# Patient Record
Sex: FEMALE | Race: BLACK OR AFRICAN AMERICAN | Employment: FULL TIME | URBAN - METROPOLITAN AREA
[De-identification: names, ages, dates, MRNs, and addresses within clinical notes are randomized per-mention and may not be internally consistent; named-entity substitution may affect disease eponyms.]

---

## 2017-03-20 ENCOUNTER — OFFICE VISIT (OUTPATIENT)
Dept: INTERNAL MEDICINE CLINIC | Age: 22
End: 2017-03-20

## 2017-03-20 ENCOUNTER — HOSPITAL ENCOUNTER (OUTPATIENT)
Dept: LAB | Age: 22
Discharge: HOME | End: 2017-03-20

## 2017-03-20 VITALS
BODY MASS INDEX: 24.44 KG/M2 | RESPIRATION RATE: 18 BRPM | HEIGHT: 66 IN | HEART RATE: 88 BPM | WEIGHT: 152.1 LBS | DIASTOLIC BLOOD PRESSURE: 70 MMHG | OXYGEN SATURATION: 97 % | TEMPERATURE: 97.7 F | SYSTOLIC BLOOD PRESSURE: 102 MMHG

## 2017-03-20 DIAGNOSIS — Z00.00 ROUTINE MEDICAL EXAM: Primary | ICD-10-CM

## 2017-03-20 DIAGNOSIS — E07.89 THYROID FULLNESS: ICD-10-CM

## 2017-03-20 DIAGNOSIS — R53.83 FATIGUE, UNSPECIFIED TYPE: ICD-10-CM

## 2017-03-20 DIAGNOSIS — L65.9 HAIR LOSS: ICD-10-CM

## 2017-03-20 DIAGNOSIS — Z11.59 SCREENING FOR VIRAL DISEASE: ICD-10-CM

## 2017-03-20 DIAGNOSIS — R21 RASH: ICD-10-CM

## 2017-03-20 PROCEDURE — 99001 SPECIMEN HANDLING PT-LAB: CPT | Performed by: NURSE PRACTITIONER

## 2017-03-20 RX ORDER — METRONIDAZOLE 500 MG/1
TABLET ORAL
Refills: 0 | COMMUNITY
Start: 2017-03-07 | End: 2017-03-20 | Stop reason: ALTCHOICE

## 2017-03-20 RX ORDER — IBUPROFEN 800 MG/1
TABLET ORAL
Refills: 0 | COMMUNITY
Start: 2017-01-23 | End: 2017-10-13 | Stop reason: SDUPTHER

## 2017-03-20 RX ORDER — HYDROXYZINE PAMOATE 25 MG/1
CAPSULE ORAL
Refills: 0 | COMMUNITY
Start: 2017-03-17 | End: 2017-03-20 | Stop reason: SDUPTHER

## 2017-03-20 RX ORDER — HYDROXYZINE PAMOATE 25 MG/1
25 CAPSULE ORAL
Qty: 30 CAP | Refills: 0 | Status: SHIPPED | OUTPATIENT
Start: 2017-03-20 | End: 2017-05-08 | Stop reason: SDUPTHER

## 2017-03-20 RX ORDER — AZITHROMYCIN 250 MG/1
TABLET, FILM COATED ORAL
Refills: 0 | COMMUNITY
Start: 2017-03-07 | End: 2017-03-20

## 2017-03-20 RX ORDER — TRIAMCINOLONE ACETONIDE 1 MG/G
CREAM TOPICAL
Refills: 0 | COMMUNITY
Start: 2017-03-17 | End: 2018-11-02 | Stop reason: ALTCHOICE

## 2017-03-20 RX ORDER — METHYLPREDNISOLONE 4 MG/1
TABLET ORAL
Refills: 0 | COMMUNITY
Start: 2017-03-07 | End: 2017-03-20

## 2017-03-20 RX ORDER — DIAZEPAM 5 MG/1
TABLET ORAL
Refills: 0 | COMMUNITY
Start: 2017-01-23 | End: 2017-03-20

## 2017-03-20 RX ORDER — ALBUTEROL SULFATE 90 UG/1
AEROSOL, METERED RESPIRATORY (INHALATION)
Refills: 0 | COMMUNITY
Start: 2017-03-07 | End: 2018-11-02 | Stop reason: SDUPTHER

## 2017-03-20 NOTE — PROGRESS NOTES
Subjective: (As above and below)     Chief Complaint   Patient presents with    Rash     Pt Diagnosis with Pittyrisis Rosea states it has excasobated    Allergic Reaction     Scabe like rash     Thyroid Problem     Diagnosis with enlarge thyroid     Champ uQintana is a 23y.o. year old female who presents to establish care and discuss the following concerns:    Last PCP: over a year ago. She has an OBGYN elsewhere. Rash: she was diagnosed with ptyriasis rosea in the [address] [place] [address] emergency room 1 week ago. She has had this rash for 3 months now. She had the typical [place] Patch on her abdomen followed by dry stuck on, highly pruritic patches on her trunk and back. She states that she was given hydroxyzine which is helping with the itching, but she is concerned that she is still getting new patches - now on her inner thighs. She was also recently on flagyl (for [facility]) and z-rico for a cough - she could not tolerated the z-pack due to nausea and vomiting. She completed the flagyl - she has been on this medication several times in the past for BV. She has been using one detergent and has avoiding anything new. She has a history of eczema, and has steroid cream for this, but it was not helping with the current rash. Thyroid fullness: she was told in the past that her thyroid was enlarged but not further testing was done per patient. She does report fatigue and is concerned with a bald patch in her hairline - middle of her forehead. No other hair loss. No dysphagia. No known family hx of thyroid disorder. Reviewed PmHx, RxHx, FmHx, SocHx, AllgHx and updated in chart.     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 No current facility-administered medications for this visit. Review of Systems:   Constitutional:    Negative for fever and chills, negative diaphoresis. HEENT:              Negative for neck pain and stiffness. Eyes:                  Negative for visual disturbance, itching, redness or discharge. Respiratory:        Negative for cough and shortness of breath. Cardiovascular:  Negative for chest pain and palpitations. Gastrointestinal: Negative for nausea, vomiting, abdominal pain, diarrhea or constipation. Genitourinary:     Negative for dysuria and frequency. Musculoskeletal: Negative for falls, tenderness and swelling. Skin:                    +rash  Neurological:       Negative for dizzyness, seizure, loss of consciousness, weakness and numbness.      Objective:     Vitals:    03/20/17 1519   BP: 102/70   Pulse: 88   Resp: 18   Temp: 97.7 °F (36.5 °C)   TempSrc: Oral   SpO2: 97%   Weight: 152 lb 1.6 oz (69 kg)   Height: 5' 6\" (1.676 m)         Physical Examination: General appearance - alert, well appearing, and in no distress  Eyes - pupils equal and reactive, extraocular eye movements intact  Ears - bilateral TM's and external ear canals normal  Mouth - mucous membranes moist, pharynx normal without lesions  Neck - supple, no significant adenopathy. Thyroid: she does have bilateral fullness - no distinct nodules palpated  Chest - clear to auscultation, no wheezes, rales or rhonchi, symmetric air entry  Heart - normal rate, regular rhythm, normal S1, S2, no murmurs, rubs, clicks or gallops  Skin - rash present on abdomen, back, nape of neck and inner thighs. She does have a larger patch ([place] patch) on right side of abdomen - other lesions are dry circular patches approx 2cm-6cm. No redness. No s/s of secondary infxn    Hairline: in the middle of her forehead at hairline, she does have mild thinning of hair    Assessment/ Plan:   Follow-up Disposition:  Return if symptoms worsen or fail to improve. Reassured patient regarding rash, but recc derm f/u since it is continuing to spread after 3 months    1. Routine medical exam    - TSH REFLEX TO T4  - METABOLIC PANEL, COMPREHENSIVE    2. Rash    - REFERRAL TO DERMATOLOGY  - METABOLIC PANEL, COMPREHENSIVE  - CBC WITH AUTOMATED DIFF  - hydrOXYzine pamoate (VISTARIL) 25 mg capsule; Take 1 Cap by mouth three (3) times daily as needed for Itching. Dispense: 30 Cap; Refill: 0    3. Thyroid fullness    - US THYROID/PARATHYROID/SOFT TISS; Future  - TSH REFLEX TO T4    4. Screening for viral disease    - HIV 1/2 AG/AB, 4TH GENERATION,W RFLX CONFIRM    5. Hair loss    - TSH REFLEX TO T4  - METABOLIC PANEL, COMPREHENSIVE  - CBC WITH AUTOMATED DIFF    6. Fatigue, unspecified type    - METABOLIC PANEL, COMPREHENSIVE  - CBC WITH AUTOMATED DIFF        I have discussed the diagnosis with the patient and the intended plan as seen in the above orders. The patient has received an after-visit summary and questions were answered concerning future plans. Pt conveyed understanding of plan.       Medication Side Effects and Warnings were discussed with patient: yes  Patient Labs were reviewed: yes  Patient Past Records were reviewed: yes    Rosaline Shah, NP

## 2017-03-20 NOTE — PATIENT INSTRUCTIONS

## 2017-03-20 NOTE — MR AVS SNAPSHOT
Visit Information Date & Time Provider Department Dept. Phone Encounter #  
 3/20/2017  3:00 PM Rosaline MARIE Amanda Romero, [address]  [id number] Follow-up Instructions Return if symptoms worsen or fail to improve. Upcoming Health Maintenance Date Due  
 HPV AGE 9Y-34Y (1 of 3 - Female 3 Dose Series) 2/22/2004 DTaP/Tdap/Td series (1 - Tdap) 2/22/2014 PAP AKA CERVICAL CYTOLOGY 2/22/2014 INFLUENZA AGE 9 TO ADULT 8/1/2016 Allergies as of 3/20/2017  Review Complete On: 3/20/2017 By: Chaka Woodruff. Amanda Romero NP Severity Noted Reaction Type Reactions Azithromycin  03/20/2017   Intolerance Other (comments) Nausea, vomiting Pcn [Penicillins]  03/20/2017   Side Effect Other (comments) Current Immunizations  Never Reviewed No immunizations on file. Not reviewed this visit You Were Diagnosed With   
  
 Codes Comments Routine medical exam    -  Primary ICD-10-CM: Z00.00 ICD-9-CM: V70.0 Rash     ICD-10-CM: R21 
ICD-9-CM: 782.1 Thyroid fullness     ICD-10-CM: E07.89 ICD-9-CM: 246.8 Screening for viral disease     ICD-10-CM: Z11.59 
ICD-9-CM: V73.99 Hair loss     ICD-10-CM: L65.9 ICD-9-CM: 704.00 Fatigue, unspecified type     ICD-10-CM: R53.83 ICD-9-CM: 780.79 Vitals BP Pulse Temp Resp Height(growth percentile) Weight(growth percentile) 102/70 (BP 1 Location: Left arm, BP Patient Position: Sitting) 88 97.7 °F (36.5 °C) (Oral) 18 5' 6\" (1.676 m) 152 lb 1.6 oz (69 kg) LMP SpO2 BMI OB Status Smoking Status 02/20/2017 (Exact Date) 97% 24.55 kg/m2 Having regular periods Never Smoker BMI and BSA Data Body Mass Index Body Surface Area 24.55 kg/m 2 1.79 m 2 Preferred Pharmacy Pharmacy Name Phone [phone] [facility], [address] [facility] [phone] Your Updated Medication List  
  
   
 This list is accurate as of: 3/20/17  3:57 PM.  Always use your most recent med list.  
  
  
  
  
 hydrOXYzine pamoate 25 mg capsule Commonly known as:  VISTARIL Take 1 Cap by mouth three (3) times daily as needed for Itching. ibuprofen 800 mg tablet Commonly known as:  MOTRIN  
take 1 tablet by mouth every 8 hours if needed  
  
 triamcinolone acetonide 0.1 % topical cream  
Commonly known as:  KENALOG  
apply to affected area twice a day UNITL RESOLVED  
  
 VENTOLIN HFA 90 mcg/actuation inhaler Generic drug:  albuterol  
inhale 2 puffs by mouth every 4 hours if needed for wheezing Prescriptions Sent to Pharmacy Refills  
 hydrOXYzine pamoate (VISTARIL) 25 mg capsule 0 Sig: Take 1 Cap by mouth three (3) times daily as needed for Itching. Class: Normal  
 Pharmacy: [address] [place] [address] #: [phone] Route: Oral  
  
We Performed the Following CBC WITH AUTOMATED DIFF [75152 CPT(R)] HIV 1/2 AG/AB, 4TH GENERATION,W RFLX CONFIRM [[id number] Custom] METABOLIC PANEL, COMPREHENSIVE [97925 CPT(R)] REFERRAL TO DERMATOLOGY [REF19 Custom] Comments:  
 Please evaluate patient for ?ptyriasis but > 3months TSH REFLEX TO T4 [[id number] Custom] Follow-up Instructions Return if symptoms worsen or fail to improve. To-Do List   
 03/20/2017 Imaging:  US THYROID/PARATHYROID/SOFT TISS Referral Information Referral ID Referred By Referred To  
  
 [id number] Divina Jacome DO   
   [facility] [place] [address] Phone: [phone] Fax: [phone] Visits Status Start Date End Date 1 New Request 3/20/17 3/20/18 If your referral has a status of pending review or denied, additional information will be sent to support the outcome of this decision. Patient Instructions Rash: Care Instructions Your Care Instructions A rash is any irritation or inflammation of the skin. Rashes have many possible causes, including allergy, infection, illness, heat, and emotional stress. Follow-up care is a key part of your treatment and safety. Be sure to make and go to all appointments, and call your doctor if you are having problems. Its also a good idea to know your test results and keep a list of the medicines you take. How can you care for yourself at home? · Wash the area with water only. Soap can make dryness and itching worse. Pat dry. · Put cold, wet cloths on the rash to reduce itching. · Keep cool, and stay out of the sun. · Leave the rash open to the air as much of the time as possible. · Sometimes petroleum jelly (Vaseline) can help relieve the discomfort caused by a rash. A moisturizing lotion, such as Cetaphil, also may help. Calamine lotion may help for rashes caused by contact with something (such as a plant or soap) that irritated the skin. Use it 3 or 4 times a day. · If your doctor prescribed a cream, use it as directed. If your doctor prescribed medicine, take it exactly as directed. · If your rash itches so badly that it interferes with your normal activities, take an over-the-counter antihistamine, such as diphenhydramine (Benadryl) or loratadine (Claritin). Read and follow all instructions on the label. When should you call for help? Call your doctor now or seek immediate medical care if: 
· You have signs of infection, such as: 
¨ Increased pain, swelling, warmth, or redness. ¨ Red streaks leading from the area. ¨ Pus draining from the area. ¨ A fever. · You have joint pain along with the rash. Watch closely for changes in your health, and be sure to contact your doctor if: 
· Your rash is changing or getting worse. For example, call if you have pain along with the rash, the rash is spreading, or you have new blisters. · You do not get better after 1 week. Where can you learn more? Go to http://selena-slona.info/. Enter L809 in the search box to learn more about \"Rash: Care Instructions. \" Current as of: February 5, 2016 Content Version: 11.1 © 0832-5528 [employer]. Care instructions adapted under license by [employer] (which disclaims liability or warranty for this information). If you have questions about a medical condition or this instruction, always ask your healthcare professional. [address] any warranty or liability for your use of this information. Introducing [facility]! Glenna Lamb introduces Save On Medical patient portal. Now you can access parts of your medical record, email your doctor's office, and request medication refills online. 1. In your internet browser, go to [email or web address] 2. Click on the First Time User? Click Here link in the Sign In box. You will see the New Member Sign Up page. 3. Enter your Save On [employer] Access Code exactly as it appears below. You will not need to use this code after youve completed the sign-up process. If you do not sign up before the expiration date, you must request a new code. · [employer] Access Code: [email or web address] Expires: 6/18/2017  2:49 PM 
 
4. Enter the last four digits of your Social Security Number (xxxx) and Date of Birth (mm/dd/yyyy) as indicated and click Submit. You will be taken to the next sign-up page. 5. Create a Asante Solutionst ID. This will be your Save On Medical login ID and cannot be changed, so think of one that is secure and easy to remember. 6. Create a Save On Medical password. You can change your password at any time. 7. Enter your Password Reset Question and Answer. This can be used at a later time if you forget your password. 8. Enter your e-mail address. You will receive e-mail notification when new information is available in [address] 9. Click Sign Up. You can now view and download portions of your medical record. 10. Click the Download Summary menu link to download a portable copy of your medical information. If you have questions, please visit the Frequently Asked Questions section of the rumr: turn off the lights website. Remember, rumr: turn off the lights is NOT to be used for urgent needs. For medical emergencies, dial 911. Now available from your iPhone and Android! Please provide this summary of care documentation to your next provider. Your primary care clinician is listed as Irina Varghese. If you have any questions after today's visit, please call [phone].

## 2017-03-20 NOTE — PROGRESS NOTES
Pt here for   Chief Complaint   Patient presents with    Rash     Pt Diagnosis with Pittyrisis Leeleea states it has excasobated    Allergic Reaction     Scabe like rash     Thyroid Problem     Diagnosis with enlarge thyroid         1. Have you been to the ER, urgent care clinic since your last visit? Hospitalized since your last visit? Yes Where: [facility] 2 weeks ago cough    2. Have you seen or consulted any other health care providers outside of the [address] [place] [address] since your last visit? Include any pap smears or colon screening.  No

## 2017-03-21 LAB
ALBUMIN SERPL-MCNC: 4.7 G/DL (ref 3.5–5.5)
ALBUMIN/GLOB SERPL: 1.6 {RATIO} (ref 1.2–2.2)
ALP SERPL-CCNC: 55 IU/L (ref 39–117)
ALT SERPL-CCNC: 9 IU/L (ref 0–32)
AST SERPL-CCNC: 13 IU/L (ref 0–40)
BASOPHILS # BLD AUTO: 0 X10E3/UL (ref 0–0.2)
BASOPHILS NFR BLD AUTO: 0 %
BILIRUB SERPL-MCNC: 0.3 MG/DL (ref 0–1.2)
BUN SERPL-MCNC: 12 MG/DL (ref 6–20)
BUN/CREAT SERPL: 17 (ref 8–20)
CALCIUM SERPL-MCNC: 9.5 MG/DL (ref 8.7–10.2)
CHLORIDE SERPL-SCNC: 100 MMOL/L (ref 96–106)
CO2 SERPL-SCNC: 23 MMOL/L (ref 18–29)
CREAT SERPL-MCNC: 0.72 MG/DL (ref 0.57–1)
EOSINOPHIL # BLD AUTO: 0.2 X10E3/UL (ref 0–0.4)
EOSINOPHIL NFR BLD AUTO: 3 %
ERYTHROCYTE [DISTWIDTH] IN BLOOD BY AUTOMATED COUNT: 12.8 % (ref 12.3–15.4)
GLOBULIN SER CALC-MCNC: 3 G/DL (ref 1.5–4.5)
GLUCOSE SERPL-MCNC: 87 MG/DL (ref 65–99)
HCT VFR BLD AUTO: 42 % (ref 34–46.6)
HGB BLD-MCNC: 14.1 G/DL (ref 11.1–15.9)
HIV 1+2 AB+HIV1 P24 AG SERPL QL IA: NON REACTIVE
IMM GRANULOCYTES # BLD: 0 X10E3/UL (ref 0–0.1)
IMM GRANULOCYTES NFR BLD: 0 %
LYMPHOCYTES # BLD AUTO: 2.5 X10E3/UL (ref 0.7–3.1)
LYMPHOCYTES NFR BLD AUTO: 38 %
MCH RBC QN AUTO: 30.1 PG (ref 26.6–33)
MCHC RBC AUTO-ENTMCNC: 33.6 G/DL (ref 31.5–35.7)
MCV RBC AUTO: 90 FL (ref 79–97)
MONOCYTES # BLD AUTO: 0.4 X10E3/UL (ref 0.1–0.9)
MONOCYTES NFR BLD AUTO: 6 %
NEUTROPHILS # BLD AUTO: 3.4 X10E3/UL (ref 1.4–7)
NEUTROPHILS NFR BLD AUTO: 53 %
PLATELET # BLD AUTO: 347 X10E3/UL (ref 150–379)
POTASSIUM SERPL-SCNC: 4 MMOL/L (ref 3.5–5.2)
PROT SERPL-MCNC: 7.7 G/DL (ref 6–8.5)
RBC # BLD AUTO: 4.69 X10E6/UL (ref 3.77–5.28)
SODIUM SERPL-SCNC: 140 MMOL/L (ref 134–144)
TSH SERPL DL<=0.005 MIU/L-ACNC: 2.05 UIU/ML (ref 0.45–4.5)
WBC # BLD AUTO: 6.6 X10E3/UL (ref 3.4–10.8)

## 2017-04-25 ENCOUNTER — TELEPHONE (OUTPATIENT)
Dept: INTERNAL MEDICINE CLINIC | Age: 22
End: 2017-04-25

## 2017-04-25 NOTE — TELEPHONE ENCOUNTER
Spoke to Dermatologist,   Please call patient about short term disability form and how should she move forward.

## 2017-04-26 NOTE — TELEPHONE ENCOUNTER
Writer contacted patient and left a voicemail staing that the form has been completed and faxed and she can pick it up.

## 2017-05-08 DIAGNOSIS — R21 RASH: ICD-10-CM

## 2017-05-08 NOTE — TELEPHONE ENCOUNTER
Patient called and stated that she needed to have the hydroxyzine refilled. She didn't pick it up from the pharmacy here in [facility]. She is now in [place] and would like for it to be filled there. The contact number is [phone].

## 2017-05-09 RX ORDER — HYDROXYZINE PAMOATE 25 MG/1
25 CAPSULE ORAL
Qty: 30 CAP | Refills: 0 | Status: SHIPPED | OUTPATIENT
Start: 2017-05-09 | End: 2018-11-02 | Stop reason: ALTCHOICE

## 2017-05-15 ENCOUNTER — TELEPHONE (OUTPATIENT)
Dept: INTERNAL MEDICINE CLINIC | Age: 22
End: 2017-05-15

## 2017-05-15 NOTE — TELEPHONE ENCOUNTER
Pt is requesting medical records to be faxed to [facility]. Pt has appt today at 11:00am. ([phone]) [phone].    Best (C) for [phone].

## 2017-05-16 NOTE — TELEPHONE ENCOUNTER
Faxed last office note and labs to [facility] [phone] per patient request [phone] Judi Roche LPN

## 2017-06-20 ENCOUNTER — TELEPHONE (OUTPATIENT)
Dept: INTERNAL MEDICINE CLINIC | Age: 22
End: 2017-06-20

## 2017-06-21 NOTE — TELEPHONE ENCOUNTER
Spoke with patient, she states that she would like a return to work note. She states that her skin problem is significantly cleared up.

## 2017-10-05 ENCOUNTER — OFFICE VISIT (OUTPATIENT)
Dept: INTERNAL MEDICINE CLINIC | Age: 22
End: 2017-10-05

## 2017-10-05 VITALS
SYSTOLIC BLOOD PRESSURE: 102 MMHG | TEMPERATURE: 98.3 F | WEIGHT: 163.1 LBS | OXYGEN SATURATION: 97 % | BODY MASS INDEX: 26.21 KG/M2 | HEART RATE: 96 BPM | HEIGHT: 66 IN | RESPIRATION RATE: 18 BRPM | DIASTOLIC BLOOD PRESSURE: 67 MMHG

## 2017-10-05 DIAGNOSIS — G44.52 NEW DAILY PERSISTENT HEADACHE: Primary | ICD-10-CM

## 2017-10-05 DIAGNOSIS — N92.6 ABNORMAL MENSTRUAL CYCLE: ICD-10-CM

## 2017-10-05 RX ORDER — VALACYCLOVIR HYDROCHLORIDE 500 MG/1
TABLET, FILM COATED ORAL
Refills: 3 | COMMUNITY
Start: 2017-06-29 | End: 2018-11-02 | Stop reason: ALTCHOICE

## 2017-10-05 NOTE — LETTER
NOTIFICATION RETURN TO WORK / SCHOOL 
 
10/5/2017 3:46 PM 
 
Ms. Anushka Altman [address] [place] [phone] To Whom It May Concern: Anushka Altman is currently under the care of Neetu N Giles Yañez. Please excuse her from work on 9/28/17, 10/3/17 and 10/5/17. If there are questions or concerns please have the patient contact our office. Sincerely, Rosaline Russell NP

## 2017-10-05 NOTE — PATIENT INSTRUCTIONS

## 2017-10-05 NOTE — PROGRESS NOTES
Subjective: (As above and below)     Chief Complaint   Patient presents with    Migraine     sensative to light for one week    Menstrual Problem     Pt has not had period since     Pelvic Pain     had two abnormal pap     Felicia Tucker is a 23y.o. year old female who presents for follow up from ED visit for persistent headache. She went to [facility], she states labs were done but no imaging. She was given rx for toradol and something else that she does not remember but she did not get these filled yet. Today is the first day that she has not had a headache. When she was in high school, she had headaches that were attributed to need for glasses. Her headaches went away until approx 1 week ago. They are across her forehead and are pressure/throbbing like. Associated phono/photophobia. No associated blurred vision, dizziness, changes in loc,nausea or vomiting. Motrin helped for short period of time but then ha returned. No fevers, no URI symptoms, no allergy symptoms. She does not feel stressed out but does work 2 jobs and is very busy. Her asthma is well controlled    Abnormal menses: she has not had a cycle since August. She has been off depo for approx 3 years - she had been having regular cycles up until August. She is in need of new OBGYN closer to home also because she states she had 2 abnormal pap exams in a row - does not recall getting colposcopy. Reviewed PmHx, RxHx, FmHx, SocHx, AllgHx and updated in chart.   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  VENTOLIN HFA 90 mcg/actuation inhaler inhale 2 puffs by mouth every 4 hours if needed for wheezing    ibuprofen (MOTRIN) 800 mg tablet take 1 tablet by mouth every 8 hours if needed    triamcinolone acetonide (KENALOG) 0.1 % topical cream apply to affected area twice a day UNITL RESOLVED     No current facility-administered medications for this visit. Review of Systems:   Constitutional:    Negative for fever and chills, negative diaphoresis. HEENT:              Negative for neck pain and stiffness. Eyes:                  Negative for visual disturbance, itching, redness or discharge. Respiratory:        Negative for cough and shortness of breath. Cardiovascular:  Negative for chest pain and palpitations. Gastrointestinal: Negative for nausea, vomiting, abdominal pain, diarrhea or constipation. Genitourinary:     Negative for dysuria and frequency. Musculoskeletal: Negative for falls, tenderness and swelling. Skin:                    Negative for rash, masses or lesions. Neurological:       Negative for dizzyness, seizure, loss of consciousness, weakness and numbness.      Objective:     Vitals:    10/05/17 1512   BP: 102/67   Pulse: 96   Resp: 18   Temp: 98.3 °F (36.8 °C)   TempSrc: Oral   SpO2: 97%   Weight: 163 lb 1.6 oz (74 kg)   Height: 5' 6\" (1.676 m)           Physical Examination: General appearance - alert, well appearing, and in no distress  Mental status - alert, oriented to person, place, and time  Eyes - pupils equal and reactive, extraocular eye movements intact  Ears - bilateral TM's and external ear canals normal  Mouth - mucous membranes moist, pharynx normal without lesions  Chest - clear to auscultation, no wheezes, rales or rhonchi, symmetric air entry  Heart - normal rate, regular rhythm, normal S1, S2, no murmurs, rubs, clicks or gallops  Neurological - alert, oriented, normal speech, no focal findings or movement disorder noted      Assessment/ Plan:   Follow-up Disposition: Not on File    1. New daily persistent headache  No ha today, recc get meds from ED to have if it returns, recc keep ha diary, reduce stress, rtc if returns    2. Abnormal menstrual cycle    - REFERRAL TO OBSTETRICS AND GYNECOLOGY  - AMB POC URINE PREGNANCY TEST, VISUAL COLOR COMPARISON        I have discussed the diagnosis with the patient and the intended plan as seen in the above orders. The patient has received an after-visit summary and questions were answered concerning future plans. Pt conveyed understanding of plan. Medication Side Effects and Warnings were discussed with patient: yes  Patient Labs were reviewed: yes  Patient Past Records were reviewed:  yes    Day Harris.  Caridad Bowman NP

## 2017-10-05 NOTE — PROGRESS NOTES
Pt here for   Chief Complaint   Patient presents with    Migraine     sensative to light for one week    Menstrual Problem     Pt has not had period since     Pelvic Pain     had two abnormal pap     1. Have you been to the ER, urgent care clinic since your last visit? Hospitalized since your last visit? Yes [facility]    2. Have you seen or consulted any other health care providers outside of the [address] [place] [address] since your last visit? Include any pap smears or colon screening.  No     Pt denies pain at this time      PHQ over the last two weeks 10/5/2017   Little interest or pleasure in doing things Not at all   Feeling down, depressed or hopeless Not at all   Total Score PHQ 2 0

## 2017-10-05 NOTE — MR AVS SNAPSHOT
Visit Information Date & Time Provider Department Dept. Phone Encounter #  
 10/5/2017  3:30 PM Rosaline Jansen, [address] [id number] Upcoming Health Maintenance Date Due  
 HPV AGE 9Y-34Y (1 of 3 - Female 3 Dose Series) 2/22/2004 DTaP/Tdap/Td series (1 - Tdap) 2/22/2014 PAP AKA CERVICAL CYTOLOGY 2/22/2014 Allergies as of 10/5/2017  Review Complete On: 10/5/2017 By: Kulwant Ma LPN Severity Noted Reaction Type Reactions Azithromycin  03/20/2017   Intolerance Other (comments) Nausea, vomiting Pcn [Penicillins]  03/20/2017   Side Effect Other (comments) Current Immunizations  Never Reviewed No immunizations on file. Not reviewed this visit You Were Diagnosed With   
  
 Codes Comments New daily persistent headache    -  Primary ICD-10-CM: G44.52 
ICD-9-CM: 339.42 Abnormal menstrual cycle     ICD-10-CM: N92.6 ICD-9-CM: 626.9 Vitals BP Pulse Temp Resp Height(growth percentile) Weight(growth percentile) 102/67 (BP 1 Location: Left arm, BP Patient Position: Sitting) 96 98.3 °F (36.8 °C) (Oral) 18 5' 6\" (1.676 m) 163 lb 1.6 oz (74 kg) LMP SpO2 BMI OB Status Smoking Status 08/26/2017 (Exact Date) 97% 26.33 kg/m2 Having regular periods Never Smoker BMI and BSA Data Body Mass Index Body Surface Area  
 26.33 kg/m 2 1.86 m 2 Preferred Pharmacy Pharmacy Name Phone [facility] [address] [place] [address] [phone] Your Updated Medication List  
  
   
This list is accurate as of: 10/5/17  3:47 PM.  Always use your most recent med list.  
  
  
  
  
 hydrOXYzine pamoate 25 mg capsule Commonly known as:  VISTARIL Take 1 Cap by mouth three (3) times daily as needed for Itching. ibuprofen 800 mg tablet Commonly known as:  MOTRIN  
take 1 tablet by mouth every 8 hours if needed triamcinolone acetonide 0.1 % topical cream  
Commonly known as:  KENALOG  
apply to affected area twice a day UNITL RESOLVED  
  
 valACYclovir 500 mg tablet Commonly known as:  VALTREX  
TAKE 1 TABLET BY MOUTH TWICE A DAY FOR 3 DAYS  
  
 VENTOLIN HFA 90 mcg/actuation inhaler Generic drug:  albuterol  
inhale 2 puffs by mouth every 4 hours if needed for wheezing We Performed the Following REFERRAL TO OBSTETRICS AND GYNECOLOGY [REF51 Custom] Comments:  
 Please evaluate patient for abnormal menses Referral Information Referral ID Referred By Referred To  
  
 [id number] Margarita Fig[address] Ivonne Carrillo Phone: [phone] Fax: [phone] Visits Status Start Date End Date 1 New Request 10/5/17 10/5/18 If your referral has a status of pending review or denied, additional information will be sent to support the outcome of this decision. Patient Instructions Headache: Care Instructions Your Care Instructions Headaches have many possible causes. Most headaches aren't a sign of a more serious problem, and they will get better on their own. Home treatment may help you feel better faster. The doctor has checked you carefully, but problems can develop later. If you notice any problems or new symptoms, get medical treatment right away. Follow-up care is a key part of your treatment and safety. Be sure to make and go to all appointments, and call your doctor if you are having problems. It's also a good idea to know your test results and keep a list of the medicines you take. How can you care for yourself at home? · Do not drive if you have taken a prescription pain medicine. · Rest in a quiet, dark room until your headache is gone. Close your eyes and try to relax or go to sleep. Don't watch TV or read.  
· Put a cold, moist cloth or cold pack on the painful area for 10 to 20 minutes at a time. Put a thin cloth between the cold pack and your skin. · Use a warm, moist towel or a heating pad set on low to relax tight shoulder and neck muscles. · Have someone gently massage your neck and shoulders. · Take pain medicines exactly as directed. ¨ If the doctor gave you a prescription medicine for pain, take it as prescribed. ¨ If you are not taking a prescription pain medicine, ask your doctor if you can take an over-the-counter medicine. · Be careful not to take pain medicine more often than the instructions allow, because you may get worse or more frequent headaches when the medicine wears off. · Do not ignore new symptoms that occur with a headache, such as a fever, weakness or numbness, vision changes, or confusion. These may be signs of a more serious problem. To prevent headaches · Keep a headache diary so you can figure out what triggers your headaches. Avoiding triggers may help you prevent headaches. Record when each headache began, how long it lasted, and what the pain was like (throbbing, aching, stabbing, or dull). Write down any other symptoms you had with the headache, such as nausea, flashing lights or dark spots, or sensitivity to bright light or loud noise. Note if the headache occurred near your period. List anything that might have triggered the headache, such as certain foods (chocolate, cheese, wine) or odors, smoke, bright light, stress, or lack of sleep. · Find healthy ways to deal with stress. Headaches are most common during or right after stressful times. Take time to relax before and after you do something that has caused a headache in the past. 
· Try to keep your muscles relaxed by keeping good posture. Check your jaw, face, neck, and shoulder muscles for tension, and try relaxing them. When sitting at a desk, change positions often, and stretch for 30 seconds each hour. · Get plenty of sleep and exercise. · Eat regularly and well. Long periods without food can trigger a headache. · Treat yourself to a massage. Some people find that regular massages are very helpful in relieving tension. · Limit caffeine by not drinking too much coffee, tea, or soda. But don't quit caffeine suddenly, because that can also give you headaches. · Reduce eyestrain from computers by blinking frequently and looking away from the computer screen every so often. Make sure you have proper eyewear and that your monitor is set up properly, about an arm's length away. · Seek help if you have depression or anxiety. Your headaches may be linked to these conditions. Treatment can both prevent headaches and help with symptoms of anxiety or depression. When should you call for help? Call 911 anytime you think you may need emergency care. For example, call if: 
· You have signs of a stroke. These may include: 
¨ Sudden numbness, paralysis, or weakness in your face, arm, or leg, especially on only one side of your body. ¨ Sudden vision changes. ¨ Sudden trouble speaking. ¨ Sudden confusion or trouble understanding simple statements. ¨ Sudden problems with walking or balance. ¨ A sudden, severe headache that is different from past headaches. Call your doctor now or seek immediate medical care if: 
· You have a new or worse headache. · Your headache gets much worse. Where can you learn more? Go to http://selena-sloan.info/. Enter M271 in the search box to learn more about \"Headache: Care Instructions. \" Current as of: October 14, 2016 Content Version: 11.3 © 7674-8888 [employer]. Care instructions adapted under license by [employer] (which disclaims liability or warranty for this information).  If you have questions about a medical condition or this instruction, always ask your healthcare professional. [place] [address] any warranty or liability for your use of this information. Introducing [facility]! [employer] introduces "[employer] patient portal. Now you can access parts of your medical record, email your doctor's office, and request medication refills online. 1. In your internet browser, go to [email or web address] acquired by [employer]Family 2. Click on the First Time User? Click Here link in the Sign In box. You will see the New Member Sign Up page. 3. Enter your "[employer] Access Code exactly as it appears below. You will not need to use this code after youve completed the sign-up process. If you do not sign up before the expiration date, you must request a new code. · "[employer] Access Code: [email or web address] Expires: 1/3/2018  3:02 PM 
 
4. Enter the last four digits of your Social Security Number (xxxx) and Date of Birth (mm/dd/yyyy) as indicated and click Submit. You will be taken to the next sign-up page. 5. Create a "[employer] ID. This will be your "[employer] login ID and cannot be changed, so think of one that is secure and easy to remember. 6. Create a "[employer] password. You can change your password at any time. 7. Enter your Password Reset Question and Answer. This can be used at a later time if you forget your password. 8. Enter your e-mail address. You will receive e-mail notification when new information is available in [address] 9. Click Sign Up. You can now view and download portions of your medical record. 10. Click the Download Summary menu link to download a portable copy of your medical information. If you have questions, please visit the Frequently Asked Questions section of the "[employer] website. Remember, "[employer] is NOT to be used for urgent needs. For medical emergencies, dial 911. Now available from your iPhone and Android! Please provide this summary of care documentation to your next provider. Your primary care clinician is listed as Nyla Pennington.  If you have any questions after today's visit, please call [phone].

## 2017-10-13 ENCOUNTER — TELEPHONE (OUTPATIENT)
Dept: INTERNAL MEDICINE CLINIC | Age: 22
End: 2017-10-13

## 2017-10-13 RX ORDER — IBUPROFEN 800 MG/1
800 TABLET ORAL
Qty: 60 TAB | Refills: 0 | Status: SHIPPED | OUTPATIENT
Start: 2017-10-13 | End: 2021-04-07 | Stop reason: SDUPTHER

## 2017-10-13 NOTE — TELEPHONE ENCOUNTER
Called pt back, ty friedman    She states that after she left her ha seemed better but returned a few days ago. At the time of her visit she did not remember what the second med was that she was given in ED - it turns out this was flexeril (pt had been c/o neck pain). She took this for her head and it made her very sleepy and not surprising did not help with ha  She is not taking toradol either    She says the ha is the same. Motrin not working. She would like a refill, however, for when she has other aches/pain.  She is planning on trying excedrin today  She missed 2 days of work and asks for a letter    Advised against taking too many otcs - risk of med overuse ha    Advised come in if not improving for neuro referral/fu    Pt verb understanding, she will  letter at desk

## 2017-10-13 NOTE — TELEPHONE ENCOUNTER
PT states you told her to take the medicine the hospital gave her ( Ketoralac  And flexeril). She states it is making her sick. Symptoms-nausea and the muscle relaxer has her out of it and she missed wok on yesterday.  Pt # [phone]

## 2018-11-02 ENCOUNTER — OFFICE VISIT (OUTPATIENT)
Dept: INTERNAL MEDICINE CLINIC | Age: 23
End: 2018-11-02

## 2018-11-02 VITALS
DIASTOLIC BLOOD PRESSURE: 73 MMHG | TEMPERATURE: 98.3 F | WEIGHT: 167 LBS | HEIGHT: 66 IN | OXYGEN SATURATION: 99 % | SYSTOLIC BLOOD PRESSURE: 104 MMHG | RESPIRATION RATE: 16 BRPM | HEART RATE: 101 BPM | BODY MASS INDEX: 26.84 KG/M2

## 2018-11-02 DIAGNOSIS — N76.0 ACUTE VAGINITIS: Primary | ICD-10-CM

## 2018-11-02 RX ORDER — ALBUTEROL SULFATE 90 UG/1
AEROSOL, METERED RESPIRATORY (INHALATION)
Qty: 1 INHALER | Refills: 0 | Status: SHIPPED | OUTPATIENT
Start: 2018-11-02 | End: 2020-05-01 | Stop reason: SDUPTHER

## 2018-11-02 RX ORDER — METRONIDAZOLE 7.5 MG/G
1 GEL VAGINAL
Qty: 187.5 MG | Refills: 0 | Status: SHIPPED | OUTPATIENT
Start: 2018-11-02 | End: 2018-11-07

## 2018-11-02 NOTE — PATIENT INSTRUCTIONS
Bacterial Vaginosis: Care Instructions  Your Care Instructions    Bacterial vaginosis is a type of vaginal infection. It is caused by excess growth of certain bacteria that are normally found in the vagina. Symptoms can include itching, swelling, pain when you urinate or have sex, and a gray or yellow discharge with a \"fishy\" odor. It is not considered an infection that is spread through sexual contact. Although symptoms can be annoying and uncomfortable, bacterial vaginosis does not usually cause other health problems. However, if you have it while you are pregnant, it can cause complications. While the infection may go away on its own, most doctors use antibiotics to treat it. You may have been prescribed pills or vaginal cream. With treatment, bacterial vaginosis usually clears up in 5 to 7 days. Follow-up care is a key part of your treatment and safety. Be sure to make and go to all appointments, and call your doctor if you are having problems. It's also a good idea to know your test results and keep a list of the medicines you take. How can you care for yourself at home? · Take your antibiotics as directed. Do not stop taking them just because you feel better. You need to take the full course of antibiotics. · Do not eat or drink anything that contains alcohol if you are taking metronidazole (Flagyl). · Keep using your medicine if you start your period. Use pads instead of tampons while using a vaginal cream or suppository. Tampons can absorb the medicine. · Wear loose cotton clothing. Do not wear nylon and other materials that hold body heat and moisture close to the skin. · Do not scratch. Relieve itching with a cold pack or a cool bath. · Do not wash your vaginal area more than once a day. Use plain water or a mild, unscented soap. Do not douche. When should you call for help?   Watch closely for changes in your health, and be sure to contact your doctor if:    · You have unexpected vaginal bleeding.     · You have a fever.     · You have new or increased pain in your vagina or pelvis.     · You are not getting better after 1 week.     · Your symptoms return after you finish the course of your medicine. Where can you learn more? Go to http://selena-sloan.info/. Kelia Ferrari in the search box to learn more about \"Bacterial Vaginosis: Care Instructions. \"  Current as of: May 15, 2018  Content Version: 11.8  © 0987-1071 [employer], [employer]. Care instructions adapted under license by [employer] (which disclaims liability or warranty for this information). If you have questions about a medical condition or this instruction, always ask your healthcare professional. [address] any warranty or liability for your use of this information.

## 2018-11-02 NOTE — PROGRESS NOTES
Chief Complaint   Patient presents with    Vaginal Discharge     x 1 week     1. Have you been to the ER, urgent care clinic since your last visit? Hospitalized since your last visit? Yes When: 10/2018 Where: [facility] ED Reason for visit: strep    2. Have you seen or consulted any other health care providers outside of the [address] [place] [address] since your last visit? Include any pap smears or colon screening.  Yes When: 10/2018 Where: [facility] Reason for visit: strep

## 2018-11-02 NOTE — LETTER
NOTIFICATION RETURN TO WORK / SCHOOL 
 
11/2/2018 10:34 AM 
 
Ms. Frederick Leong 
[address] [facility] [id number] To Whom It May Concern: Frederick Leong is currently under the care of Neetu N Giles Yañez. She will return to work/school on: 11/2/18. Please excuse her for being late. If there are questions or concerns please have the patient contact our office. Sincerely, Rosaline Swan NP

## 2018-11-02 NOTE — PROGRESS NOTES
Subjective: (As above and below)     Chief Complaint   Patient presents with    Vaginal Discharge     x 1 week     Leilani Falk is a 20y.o. year old female who presents for vaginal discharge x 1 week    She believes she has BV due to fish-like odor & white discharge. She has had BV in the past.     She has not been here in 1 year, at that time she was rf'd to obgyn for abnl menses, she did see them. No new meds. It is somewhat more regular than before. Last cycle on 10/9    She was on abx, but 1 mo ago for strept    Asthma: well controlled, rare rescue inhaler use but she asks for refills bc she lost hers in a move    Headaches: as dicussed 1 year ago - improved, noted mostly w/ stress           Reviewed PmHx, RxHx, FmHx, SocHx, AllgHx and updated in chart.   Family History   Problem Relation Age of Onset    Breast Cancer Other         in 2 of her grandmother's sister    Cancer Maternal Grandmother        Past Medical History:   Diagnosis Date    Asthma       Social History     Socioeconomic History    Marital status: SINGLE     Spouse name: Not on file    Number of children: Not on file    Years of education: Not on file    Highest education level: Not on file   Social Needs    Financial resource strain: Not on file    Food insecurity - worry: Not on file    Food insecurity - inability: Not on file   [employer] needs - medical: Not on file   [employer] needs - non-medical: Not on file   Occupational History    Not on file   Tobacco Use    Smoking status: Never Smoker    Smokeless tobacco: Never Used   Substance and Sexual Activity    Alcohol use: Yes     Comment: occasional    Drug use: No    Sexual activity: Yes   Other Topics Concern    Not on file   Social History Narrative    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  ibuprofen (MOTRIN) 800 mg tablet Take 1 Tab by mouth every eight (8) hours as needed for Pain. No current facility-administered medications for this visit. Review of Systems:   Respiratory:        Negative for cough and shortness of breath. Cardiovascular:  Negative for chest pain and palpitations. Gastrointestinal: Negative for nausea, vomiting, abdominal pain, diarrhea or constipation. Genitourinary:     Negative for dysuria and frequency. Musculoskeletal: Negative for falls, tenderness and swelling. Skin:                    Negative for rash, masses or lesions. Neurological:       Negative for dizzyness, seizure, loss of consciousness, weakness and numbness. Objective:     Vitals:    11/02/18 1013   BP: 104/73   Pulse: (!) 101   Resp: 16   Temp: 98.3 °F (36.8 °C)   TempSrc: Oral   SpO2: 99%   Weight: 167 lb (75.8 kg)   Height: 5' 6\" (1.676 m)           Physical Examination: General appearance - alert, well appearing, and in no distress  Chest - clear to auscultation, no wheezes, rales or rhonchi, symmetric air entry  Heart - normal rate, regular rhythm, normal S1, S2, no murmurs, rubs, clicks or gallops  Pelvic - VULVA: normal appearing vulva with no masses, tenderness or lesions      Assessment/ Plan:   Follow-up Disposition: Not on File    1. Acute vaginitis    - NUSWAB VAGINITIS PLUS  - metroNIDAZOLE (METROGEL) 0.75 % vaginal gel; Insert 1 Applicator into vagina nightly for 5 days. Dispense: 187.5 mg; Refill: 0        I have discussed the diagnosis with the patient and the intended plan as seen in the above orders. The patient has received an after-visit summary and questions were answered concerning future plans. Pt conveyed understanding of plan.       Medication Side Effects and Warnings were discussed with patient: yes  Patient Labs were reviewed: yes  Patient Past Records were reviewed:  yes    Claudy Ly.  Tayla Hernandez, NP

## 2018-11-06 LAB
A VAGINAE DNA VAG QL NAA+PROBE: ABNORMAL SCORE
BVAB2 DNA VAG QL NAA+PROBE: ABNORMAL SCORE
C ALBICANS DNA VAG QL NAA+PROBE: NEGATIVE
C GLABRATA DNA VAG QL NAA+PROBE: NEGATIVE
C TRACH RRNA SPEC QL NAA+PROBE: NEGATIVE
MEGA1 DNA VAG QL NAA+PROBE: ABNORMAL SCORE
N GONORRHOEA RRNA SPEC QL NAA+PROBE: NEGATIVE
T VAGINALIS RRNA SPEC QL NAA+PROBE: NEGATIVE

## 2018-11-27 RX ORDER — METRONIDAZOLE 7.5 MG/G
1 GEL VAGINAL
Qty: 187.5 MG | Refills: 0 | Status: SHIPPED | OUTPATIENT
Start: 2018-11-27 | End: 2018-12-02

## 2018-12-15 ENCOUNTER — HOSPITAL ENCOUNTER (EMERGENCY)
Age: 23
Discharge: HOME | End: 2018-12-15
Attending: EMERGENCY MEDICINE
Payer: COMMERCIAL

## 2018-12-15 VITALS
TEMPERATURE: 97.9 F | DIASTOLIC BLOOD PRESSURE: 68 MMHG | WEIGHT: 167 LBS | HEIGHT: 66 IN | RESPIRATION RATE: 16 BRPM | HEART RATE: 83 BPM | BODY MASS INDEX: 26.84 KG/M2 | SYSTOLIC BLOOD PRESSURE: 109 MMHG | OXYGEN SATURATION: 98 %

## 2018-12-15 DIAGNOSIS — S46.811A STRAIN OF RIGHT TRAPEZIUS MUSCLE, INITIAL ENCOUNTER: Primary | ICD-10-CM

## 2018-12-15 PROCEDURE — 99283 EMERGENCY DEPT VISIT LOW MDM: CPT

## 2018-12-15 PROCEDURE — 74011250637 HC RX REV CODE- 250/637: Performed by: NURSE PRACTITIONER

## 2018-12-15 RX ORDER — NAPROXEN 375 MG/1
375 TABLET ORAL 2 TIMES DAILY WITH MEALS
COMMUNITY
End: 2019-02-19 | Stop reason: SDUPTHER

## 2018-12-15 RX ORDER — DICLOFENAC SODIUM 75 MG/1
75 TABLET, DELAYED RELEASE ORAL 2 TIMES DAILY
Qty: 30 TAB | Refills: 0 | Status: SHIPPED | OUTPATIENT
Start: 2018-12-15 | End: 2019-02-19

## 2018-12-15 RX ORDER — CYCLOBENZAPRINE HCL 10 MG
10 TABLET ORAL
Status: COMPLETED | OUTPATIENT
Start: 2018-12-15 | End: 2018-12-15

## 2018-12-15 RX ORDER — CYCLOBENZAPRINE HCL 10 MG
10 TABLET ORAL
Qty: 12 TAB | Refills: 0 | Status: SHIPPED | OUTPATIENT
Start: 2018-12-15 | End: 2019-02-19 | Stop reason: SDUPTHER

## 2018-12-15 RX ORDER — KETOROLAC TROMETHAMINE 10 MG/1
10 TABLET, FILM COATED ORAL ONCE
Status: COMPLETED | OUTPATIENT
Start: 2018-12-15 | End: 2018-12-15

## 2018-12-15 RX ADMIN — KETOROLAC TROMETHAMINE 10 MG: 10 TABLET, FILM COATED ORAL at 12:44

## 2018-12-15 RX ADMIN — CYCLOBENZAPRINE HYDROCHLORIDE 10 MG: 10 TABLET, FILM COATED ORAL at 12:44

## 2018-12-15 NOTE — DISCHARGE INSTRUCTIONS
Strain or Sprain: Care Instructions  Your Care Instructions    A strain happens when you overstretch, or pull, a muscle. A sprain occurs when you stretch or tear a ligament, the tough tissue that connects one bone to another. These problems can happen when you exercise or lift something or when you are in an accident. Rest and other home care can help strains and sprains heal.  The doctor has checked you carefully, but problems can develop later. If you notice any problems or new symptoms,  get medical treatment right away. Follow-up care is a key part of your treatment and safety. Be sure to make and go to all appointments, and call your doctor if you are having problems. It's also a good idea to know your test results and keep a list of the medicines you take. How can you care for yourself at home? · If your doctor gave you a sling, splint, brace, or immobilizer, use it exactly as directed. · Rest the strained or sprained area, and follow your doctor's advice about when you can be active again. · Put ice or a cold pack on the sore area for 10 to 20 minutes at a time to stop swelling. Try this every 1 to 2 hours for 3 days (when you are awake) or until the swelling goes down. Put a thin cloth between the ice pack and your skin. Keep your splint or brace dry. · Prop up a sore arm or leg on a pillow when you ice it or anytime you sit or lie down. Try to keep it higher than the level of your heart. This will help reduce swelling. · Take pain medicines exactly as directed. ? If the doctor gave you a prescription medicine for pain, take it as prescribed. ? If you are not taking a prescription pain medicine, ask your doctor if you can take an over-the-counter medicine. · Do exercises as directed by your doctor or physical therapist.  · Return to your usual level of activity slowly. · Do not do anything that makes the pain worse. When should you call for help?   Call your doctor now or seek immediate medical care if:    · You have severe or increasing pain.     · You have tingling, weakness, or numbness in the area.     · The area turns cold or changes color.     · Your cast or splint feels too tight.     · You have symptoms of a blood clot, such as:  ? Pain in your calf, back of the knee, thigh, or groin. ? Redness and swelling in your leg or groin.     · You cannot move the strained part of your body.    Watch closely for changes in your health, and be sure to contact your doctor if:    · You do not get better as expected. Where can you learn more? Go to http://selena-sloan.info/. Enter X075 in the search box to learn more about \"Strain or Sprain: Care Instructions. \"  Current as of: November 29, 2017  Content Version: 11.8  © 5082-4629 [employer]. Care instructions adapted under license by [employer] (which disclaims liability or warranty for this information). If you have questions about a medical condition or this instruction, always ask your healthcare professional. [address] any warranty or liability for your use of this information.

## 2018-12-15 NOTE — ED NOTES
Provider gave heat packs. ..  Emergency Department Nursing Plan of Care       The Nursing Plan of Care is developed from the Nursing assessment and Emergency Department Attending provider initial evaluation. The plan of care may be reviewed in the ED Provider note.     The Plan of Care was developed with the following considerations:   Patient / Family readiness to learn indicated by:verbalized understanding and appropriate questions asked  Persons(s) to be included in education: patient  Barriers to Learning/Limitations:No    Signed     Jose Laurent RN    12/15/2018   12:39 PM
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
## 2019-02-19 ENCOUNTER — OFFICE VISIT (OUTPATIENT)
Dept: INTERNAL MEDICINE CLINIC | Age: 24
End: 2019-02-19

## 2019-02-19 VITALS
RESPIRATION RATE: 18 BRPM | SYSTOLIC BLOOD PRESSURE: 112 MMHG | BODY MASS INDEX: 27.18 KG/M2 | HEART RATE: 90 BPM | OXYGEN SATURATION: 98 % | TEMPERATURE: 97.5 F | WEIGHT: 169.1 LBS | HEIGHT: 66 IN | DIASTOLIC BLOOD PRESSURE: 67 MMHG

## 2019-02-19 DIAGNOSIS — Z00.00 WELL WOMAN EXAM (NO GYNECOLOGICAL EXAM): ICD-10-CM

## 2019-02-19 DIAGNOSIS — M25.511 ACUTE PAIN OF RIGHT SHOULDER: ICD-10-CM

## 2019-02-19 DIAGNOSIS — N76.0 ACUTE VAGINITIS: Primary | ICD-10-CM
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
## 2019-02-19 RX ORDER — CYCLOBENZAPRINE HCL 10 MG
10 TABLET ORAL
Qty: 20 TAB | Refills: 0 | Status: SHIPPED | OUTPATIENT
Start: 2019-02-19

## 2019-02-19 RX ORDER — METRONIDAZOLE 7.5 MG/G
1 GEL VAGINAL
Qty: 25 G | Refills: 0 | Status: SHIPPED | OUTPATIENT
Start: 2019-02-19 | End: 2019-02-24
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
## 2019-02-19 NOTE — PROGRESS NOTES
Pt here for Chief Complaint Patient presents with  Shoulder Pain Need referral for PT  Vaginal Discharge Pt states she needs meds for BV 1. Have you been to the ER, urgent care clinic since your last visit? Hospitalized since your last visit? No 
 
2. Have you seen or consulted any other health care providers outside of the [address] [place] [address] since your last visit? Include any pap smears or colon screening. No  
 
 
 
Pt c/o pain 5 of 10, Pt denies taking anything for pain today 3 most recent PHQ Screens 2/19/2019 Little interest or pleasure in doing things Not at all Feeling down, depressed, irritable, or hopeless Not at all Total Score PHQ 2 0

## 2020-01-14 ENCOUNTER — OFFICE VISIT (OUTPATIENT)
Dept: INTERNAL MEDICINE CLINIC | Age: 25
End: 2020-01-14

## 2020-01-14 VITALS
HEART RATE: 88 BPM | TEMPERATURE: 98.2 F | OXYGEN SATURATION: 98 % | SYSTOLIC BLOOD PRESSURE: 116 MMHG | BODY MASS INDEX: 27.5 KG/M2 | RESPIRATION RATE: 18 BRPM | DIASTOLIC BLOOD PRESSURE: 67 MMHG | HEIGHT: 66 IN | WEIGHT: 171.1 LBS

## 2020-01-14 DIAGNOSIS — Z13.220 SCREENING, LIPID: ICD-10-CM

## 2020-01-14 DIAGNOSIS — N92.6 LATE MENSES: Primary | ICD-10-CM

## 2020-01-14 DIAGNOSIS — R10.2 PELVIC PAIN: ICD-10-CM

## 2020-01-14 DIAGNOSIS — N76.0 ACUTE VAGINITIS: ICD-10-CM

## 2020-01-14 LAB
BILIRUB UR QL STRIP: NEGATIVE
GLUCOSE UR-MCNC: NEGATIVE MG/DL
HCG URINE, QL. (POC): NEGATIVE
KETONES P FAST UR STRIP-MCNC: NEGATIVE MG/DL
PH UR STRIP: 5.5 [PH] (ref 4.6–8)
PROT UR QL STRIP: NEGATIVE
SP GR UR STRIP: 1.01 (ref 1–1.03)
UA UROBILINOGEN AMB POC: NORMAL (ref 0.2–1)
URINALYSIS CLARITY POC: CLEAR
URINALYSIS COLOR POC: YELLOW
URINE BLOOD POC: NEGATIVE
URINE LEUKOCYTES POC: NEGATIVE
URINE NITRITES POC: NEGATIVE
VALID INTERNAL CONTROL?: YES

## 2020-01-14 NOTE — PROGRESS NOTES
Subjective: (As above and below)     Chief Complaint   Patient presents with    Vaginal Infection     PT states she has BV     Shaniqua Donnelly is a 30y.o. year old female who presents for     Vaginitis: for a few days, white vaginal discharge w/ fishy odor. No itching. She is sexually active w/ usual partner, no contraception  She is late on her menstrual cycle but reports that some months she is irregular  She has a follow up w/ OBGYN scheduled, she gets intermittent pelvic pains- this is not a new problem. Occurs randomly (not associated w/ menses) feels like a sharp spasm. No dysuria, cva tenderness or frequency      Wt Readings from Last 3 Encounters:   01/14/20 171 lb 1.6 oz (77.6 kg)   02/19/19 169 lb 1.6 oz (76.7 kg)   12/15/18 167 lb (75.8 kg)         Reviewed PmHx, RxHx, FmHx, SocHx, AllgHx and updated in chart. Family History   Problem Relation Age of Onset    Breast Cancer Other         in 2 of her grandmother's sister    Cancer Maternal Grandmother        Past Medical History:   Diagnosis Date    Asthma       Social History     Socioeconomic History    Marital status: SINGLE     Spouse name: Not on file    Number of children: Not on file    Years of education: Not on file    Highest education level: Not on file   Tobacco Use    Smoking status: Never Smoker    Smokeless tobacco: Never Used   Substance and Sexual Activity    Alcohol use: Yes     Comment: occasional    Drug use: No    Sexual activity: Yes     Birth control/protection: None   Social History Narrative    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  ibuprofen (MOTRIN) 800 mg tablet Take 1 Tab by mouth every eight (8) hours as needed for Pain.      No current facility-administered medications for this visit. Review of Systems:   Constitutional:    Negative for fever and chills, negative diaphoresis. HEENT:              Negative for neck pain and stiffness. Eyes:                  Negative for visual disturbance, itching, redness or discharge. Respiratory:        Negative for cough and shortness of breath. Cardiovascular:  Negative for chest pain and palpitations. Gastrointestinal: Negative for nausea, vomiting, abdominal pain, diarrhea or constipation. Genitourinary:     Negative for dysuria and frequency. Musculoskeletal: Negative for falls, tenderness and swelling. Skin:                    Negative for rash, masses or lesions. Neurological:       Negative for dizzyness, seizure, loss of consciousness, weakness and numbness.      Objective:     Vitals:    01/14/20 1002   BP: 116/67   Pulse: 88   Resp: 18   Temp: 98.2 °F (36.8 °C)   TempSrc: Oral   SpO2: 98%   Weight: 171 lb 1.6 oz (77.6 kg)   Height: 5' 6\" (1.676 m)       Results for orders placed or performed in visit on 02/19/19   NUSWAB VAGINITIS PLUS   Result Value Ref Range    Atopobium vaginae High - 2 (A) Score    BVAB 2 High - 2 (A) Score    Megasphaera 1 High - 2 (A) Score    C. albicans, PIYUSH Negative Negative    C. glabrata, PIYUSH Negative Negative    T. vaginalis, PIYUSH Negative Negative    C. trachomatis, PIYUSH Negative Negative    N. gonorrhoeae, PIYUSH Negative Negative     Results for orders placed or performed in visit on 01/14/20   AMB POC URINE PREGNANCY TEST, VISUAL COLOR COMPARISON   Result Value Ref Range    VALID INTERNAL CONTROL POC Yes     HCG urine, Ql. (POC) Negative Negative   AMB POC URINALYSIS DIP STICK AUTO W/O MICRO   Result Value Ref Range    Color (UA POC) Yellow     Clarity (UA POC) Clear     Glucose (UA POC) Negative Negative    Bilirubin (UA POC) Negative Negative    Ketones (UA POC) Negative Negative    Specific gravity (UA POC) 1.015 1.001 - 1.035    Blood (UA POC) Negative Negative    pH (UA POC) 5.5 4.6 - 8.0    Protein (UA POC) Negative Negative    Urobilinogen (UA POC) 0.2 mg/dL 0.2 - 1    Nitrites (UA POC) Negative Negative    Leukocyte esterase (UA POC) Negative Negative         Physical Examination: General appearance - alert, well appearing, and in no distress  Chest - clear to auscultation, no wheezes, rales or rhonchi, symmetric air entry  Heart - normal rate, regular rhythm, normal S1, S2, no murmurs, rubs, clicks or gallops      Assessment/ Plan:     1. Late menses  Urine pregnancy test inconclusive in office  - BETA HCG, QT  - AMB POC URINE PREGNANCY TEST, VISUAL COLOR COMPARISON    2. Screening, lipid  Also reprinted previously ordered labs that were not done  - LIPID PANEL    3. Acute vaginitis    - NUSWAB VAGINITIS PLUS    4. Pelvic pain  Has f/u w/ OBGYN scheduled  - AMB POC URINALYSIS DIP STICK AUTO W/O MICRO      I have discussed the diagnosis with the patient and the intended plan as seen in the above orders. The patient has received an after-visit summary and questions were answered concerning future plans. Pt conveyed understanding of plan. Medication Side Effects and Warnings were discussed with patient: yes  Patient Labs were reviewed: yes  Patient Past Records were reviewed:  yes    Preeti Collado.  [place]ath [place] NP

## 2020-01-23 ENCOUNTER — OFFICE VISIT (OUTPATIENT)
Dept: OBGYN CLINIC | Age: 25
End: 2020-01-23

## 2020-01-23 VITALS
HEIGHT: 66 IN | DIASTOLIC BLOOD PRESSURE: 78 MMHG | SYSTOLIC BLOOD PRESSURE: 116 MMHG | WEIGHT: 169 LBS | BODY MASS INDEX: 27.16 KG/M2

## 2020-01-23 DIAGNOSIS — N92.6 MISSED MENSES: ICD-10-CM

## 2020-01-23 DIAGNOSIS — Z01.419 WELL WOMAN EXAM: Primary | ICD-10-CM

## 2020-01-23 RX ORDER — METRONIDAZOLE 500 MG/1
500 TABLET ORAL 2 TIMES DAILY
Qty: 14 TAB | Refills: 0 | Status: SHIPPED | OUTPATIENT
Start: 2020-01-23 | End: 2020-01-30

## 2020-01-23 NOTE — PROGRESS NOTES
Annual exam ages 21-44    Sandra Laureano is a No obstetric history on file. ,  30 y.o. female   Patient's last menstrual period was 12/02/2019. She presents for her annual checkup. She is having irregular menses. With regard to the Gardasil vaccine, she received 3 of the 3 injections. Menstrual status:    Her periods are light in flow. She is using one to three pads or tampons per day, often irregular with no apparent pattern. She does not have dysmenorrhea. She reports no premenstrual symptoms. Contraception:    The current method of family planning is condoms. Sexual history:    She  reports being sexually active and has had partner(s) who are Male. She reports using the following method of birth control/protection: Condom. Medical conditions:    Since her last annual GYN exam about one year ago, she has not the following changes in her health history: none. Surgical history confirmed with patient. has no past surgical history on file. Pap and Mammogram History:    Her most recent Pap smear was normal, obtained 1 year(s) ago. The patient has never had a mammogram.    Breast Cancer History/Substance Abuse: negative    Past Medical History:   Diagnosis Date    Asthma      History reviewed. No pertinent surgical history. Current Outpatient Medications   Medication Sig Dispense Refill    VENTOLIN HFA 90 mcg/actuation inhaler inhale 2 puffs by mouth every 4 hours if needed for wheezing 1 Inhaler 0    ibuprofen (MOTRIN) 800 mg tablet Take 1 Tab by mouth every eight (8) hours as needed for Pain. 60 Tab 0    cyclobenzaprine (FLEXERIL) 10 mg tablet Take 1 Tab by mouth nightly as needed for Muscle Spasm(s). 20 Tab 0     Allergies: Azithromycin and Pcn [penicillins]     Tobacco History:  reports that she has been smoking. She has never used smokeless tobacco.  Alcohol Abuse:  reports current alcohol use. Drug Abuse:  reports no history of drug use.     Family Medical/Cancer History:   Family History   Problem Relation Age of Onset    Breast Cancer Other         in 2 of her grandmother's sister    Diabetes Other     Hypertension Other     Heart Disease Mother     No Known Problems Father     Cancer Maternal Grandmother     Heart Disease Maternal Grandmother     Breast Cancer Maternal Aunt         Review of Systems - History obtained from the patient  Constitutional: negative for weight loss, fever, night sweats  HEENT: negative for hearing loss, earache, congestion, snoring, sorethroat  CV: negative for chest pain, palpitations, edema  Resp: negative for cough, shortness of breath, wheezing  GI: negative for change in bowel habits, abdominal pain, black or bloody stools  : negative for frequency, dysuria, hematuria, vaginal discharge  MSK: negative for back pain, joint pain, muscle pain  Breast: negative for breast lumps, nipple discharge, galactorrhea  Skin :negative for itching, rash, hives  Neuro: negative for dizziness, headache, confusion, weakness  Psych: negative for anxiety, depression, change in mood  Heme/lymph: negative for bleeding, bruising, pallor    Physical Exam    Visit Vitals  /78 (BP 1 Location: Left arm, BP Patient Position: Sitting)   Ht 5' 6\" (1.676 m)   Wt 169 lb (76.7 kg)   LMP 12/02/2019   BMI 27.28 kg/m²       Constitutional  · Appearance: well-nourished, well developed, alert, in no acute distress    HENT  · Head and Face: appears normal    Neck  · Inspection/Palpation: normal appearance, no masses or tenderness  · Lymph Nodes: no lymphadenopathy present  · Thyroid: gland size normal, nontender, no nodules or masses present on palpation    Chest  · Respiratory Effort: breathing unlabored  · Auscultation: normal breath sounds    Cardiovascular  · Heart:  · Auscultation: regular rate and rhythm without murmur    Breasts  · Inspection of Breasts: breasts symmetrical, no skin changes, no discharge present, nipple appearance normal, no skin retraction present  · Palpation of Breasts and Axillae: no masses present on palpation, no breast tenderness  · Axillary Lymph Nodes: no lymphadenopathy present    Gastrointestinal  · Abdominal Examination: abdomen non-tender to palpation, normal bowel sounds, no masses present  · Liver and spleen: no hepatomegaly present, spleen not palpable  · Hernias: no hernias identified    Genitourinary  · External Genitalia: normal appearance for age, no discharge present, no tenderness present, no inflammatory lesions present, no masses present, no atrophy present  · Vagina: normal vaginal vault without central or paravaginal defects, no discharge present, no inflammatory lesions present, no masses present  · Bladder: non-tender to palpation  · Urethra: appears normal  · Cervix: normal   · Uterus: normal size, shape and consistency  · Adnexa: no adnexal tenderness present, no adnexal masses present  · Perineum: perineum within normal limits, no evidence of trauma, no rashes or skin lesions present  · Anus: anus within normal limits, no hemorrhoids present  · Inguinal Lymph Nodes: no lymphadenopathy present    Skin  · General Inspection: no rash, no lesions identified    Neurologic/Psychiatric  · Mental Status:  · Orientation: grossly oriented to person, place and time  · Mood and Affect: mood normal, affect appropriate    . Assessment:  Routine gynecologic examination  Her current medical status is satisfactory with no evidence of significant gynecologic issues.     Plan:  Counseled re: diet, exercise, healthy lifestyle  Return for yearly wellness visits  Gardisil counseling provided  Pt counseled regarding co-testing for high risk HPV with pap  Rec screening mammo at either 33 or 38

## 2020-01-23 NOTE — PATIENT INSTRUCTIONS
Vaginal Bleeding in Nonpregnant Women: Care Instructions  Your Care Instructions    Many women have bleeding or spotting between periods. Lots of things can cause it. You may bleed because of hormone problems, stress, or ovulation. Fibroids and IUDs (intrauterine devices) can also cause bleeding. If your bleeding or spotting is caused by one of these things and is not heavy or doesn't happen often, you probably don't need to worry. But in rare cases, infection, cancer, or other serious conditions can cause bleeding. So you may need more tests to find the cause of your bleeding. The doctor has checked you carefully, but problems can develop later. If you notice any problems or new symptoms, get medical treatment right away. Follow-up care is a key part of your treatment and safety. Be sure to make and go to all appointments, and call your doctor if you are having problems. It's also a good idea to know your test results and keep a list of the medicines you take. How can you care for yourself at home? · Take pain medicines exactly as directed. ? If the doctor gave you a prescription medicine for pain, take it as prescribed. ? If you are not taking a prescription pain medicine, ask your doctor if you can take an over-the-counter medicine. Do not take aspirin, which may make bleeding worse. · If your doctor prescribed birth control pills for your bleeding, take them as directed. · Eat foods that are high in iron and vitamin C. Foods high in iron include red meat, shellfish, eggs, beans, and leafy green vegetables. Foods high in vitamin C include citrus fruits, tomatoes, and broccoli. Ask your doctor if you need to take iron pills or a multivitamin. · Ask your doctor when it is okay to have sex. When should you call for help? Call 911 anytime you think you may need emergency care.  For example, call if:    · You passed out (lost consciousness).    Call your doctor now or seek immediate medical care if:    · You have severe vaginal bleeding.     · You are dizzy or lightheaded, or you feel like you may faint.     · You have new or worse belly or pelvic pain.    Watch closely for changes in your health, and be sure to contact your doctor if:    · Your bleeding gets worse.     · You think you might be pregnant.     · You do not get better as expected. Where can you learn more? Go to http://selena-sloan.info/. Enter A695 in the search box to learn more about \"Vaginal Bleeding in Nonpregnant Women: Care Instructions. \"  Current as of: February 19, 2019  Content Version: 12.2  © 9858-4257 [employer]. Care instructions adapted under license by [employer] (which disclaims liability or warranty for this information). If you have questions about a medical condition or this instruction, always ask your healthcare professional. [facility] [address] any warranty or liability for your use of this information.

## 2020-01-27 LAB
CYTOLOGIST CVX/VAG CYTO: NORMAL
CYTOLOGY CVX/VAG DOC CYTO: NORMAL
CYTOLOGY CVX/VAG DOC THIN PREP: NORMAL
DX ICD CODE: NORMAL
LABCORP, 190119: NORMAL
Lab: NORMAL
OTHER STN SPEC: NORMAL
STAT OF ADQ CVX/VAG CYTO-IMP: NORMAL

## 2020-03-19 RX ORDER — METRONIDAZOLE 7.5 MG/G
1 GEL VAGINAL
Qty: 25 G | Refills: 0 | Status: SHIPPED | OUTPATIENT
Start: 2020-03-19 | End: 2020-03-24

## 2020-05-01 RX ORDER — ALBUTEROL SULFATE 90 UG/1
AEROSOL, METERED RESPIRATORY (INHALATION)
Qty: 1 INHALER | Refills: 0 | Status: SHIPPED | OUTPATIENT
Start: 2020-05-01

## 2021-03-25 DIAGNOSIS — N76.0 ACUTE VAGINITIS: Primary | ICD-10-CM

## 2021-03-25 RX ORDER — FLUCONAZOLE 150 MG/1
150 TABLET ORAL DAILY
Qty: 1 TAB | Refills: 0 | Status: SHIPPED | OUTPATIENT
Start: 2021-03-25 | End: 2021-03-26

## 2021-03-25 RX ORDER — METRONIDAZOLE 7.5 MG/G
1 GEL VAGINAL
Qty: 25 G | Refills: 0 | Status: SHIPPED | OUTPATIENT
Start: 2021-03-25 | End: 2021-03-30

## 2021-10-10 NOTE — ED NOTES
..Patient has been instructed that they have been given flexeril* which contains opioids, benzodiazepines, or other sedating drugs. Patient is aware that they  will need to refrain from driving or operating heavy machinery after taking this medication. Patient also instructed that they need to avoid drinking alcohol and using other products containing opioids, benzodiazepines, or other sedating drugs. Patient verbalized understanding. 90

## 2023-05-21 RX ORDER — CYCLOBENZAPRINE HCL 10 MG
10 TABLET ORAL
COMMUNITY
Start: 2019-02-19

## 2023-05-21 RX ORDER — IBUPROFEN 800 MG/1
800 TABLET ORAL EVERY 8 HOURS PRN
COMMUNITY
Start: 2021-04-07

## 2023-05-21 RX ORDER — ALBUTEROL SULFATE 90 UG/1
AEROSOL, METERED RESPIRATORY (INHALATION)
COMMUNITY
Start: 2020-05-01

## 2024-10-30 NOTE — PROGRESS NOTES
Pt here for   Chief Complaint   Patient presents with    Vaginal Infection     PT states she has BV     1. Have you been to the ER, urgent care clinic since your last visit? Hospitalized since your last visit? No    2. Have you seen or consulted any other health care providers outside of the [address] [place] [address] since your last visit? Include any pap smears or colon screening.  No       Pt c/o pain 2 of 10, Pt denies taking anything for pain today    3 most recent PHQ Screens 1/14/2020   Little interest or pleasure in doing things Several days   Feeling down, depressed, irritable, or hopeless Several days   Total Score PHQ 2 2 Yes